# Patient Record
Sex: FEMALE | Race: WHITE
[De-identification: names, ages, dates, MRNs, and addresses within clinical notes are randomized per-mention and may not be internally consistent; named-entity substitution may affect disease eponyms.]

---

## 2019-11-10 ENCOUNTER — HOSPITAL ENCOUNTER (EMERGENCY)
Dept: HOSPITAL 62 - ER | Age: 29
Discharge: HOME | End: 2019-11-10
Payer: SELF-PAY

## 2019-11-10 VITALS — DIASTOLIC BLOOD PRESSURE: 69 MMHG | SYSTOLIC BLOOD PRESSURE: 128 MMHG

## 2019-11-10 DIAGNOSIS — F17.200: ICD-10-CM

## 2019-11-10 DIAGNOSIS — Z86.14: ICD-10-CM

## 2019-11-10 DIAGNOSIS — L02.412: Primary | ICD-10-CM

## 2019-11-10 PROCEDURE — 10060 I&D ABSCESS SIMPLE/SINGLE: CPT

## 2019-11-10 PROCEDURE — 99282 EMERGENCY DEPT VISIT SF MDM: CPT

## 2019-11-10 PROCEDURE — A6266 IMPREG GAUZE NO H20/SAL/YARD: HCPCS

## 2019-11-10 NOTE — ER DOCUMENT REPORT
HPI





- HPI


Time Seen by Provider: 11/10/19 15:03


Pain Level: 5


Notes: 





Otherwise healthy 29-year-old female with history of abscesses presenting with 

abscess to left axilla.  Patient reports this is been present for approximately 

3 days.  She denies any drainage from the area.  Denies any fevers.  Does report

history of MRSA.








Past Medical History





- General


Information source: Patient





- Social History


Smoking Status: Current Every Day Smoker


Family History: Reviewed & Not Pertinent


Patient has suicidal ideation: No


Patient has homicidal ideation: No


Infectious Medical History: Reports: Hx MRSA


Surgical Hx: Negative





- Immunizations


Hx Diphtheria, Pertussis, Tetanus Vaccination: Yes





Vertical Provider Document





- CONSTITUTIONAL


Notes: 





PHYSICAL EXAMINATION:





GENERAL: Well-appearing, well-nourished and in no acute distress.





HEAD: Atraumatic, normocephalic.





EYES: Pupils equal round extraocular movements intact,  conjunctiva are normal.





ENT: Nares patent





NECK: Normal range of motion





LUNGS: No respiratory distress





Musculoskeletal: Normal range of motion





NEUROLOGICAL:  Normal speech, normal gait. 





PSYCH: Normal mood, normal affect.





SKIN: Large area of induration, erythema fluctuance noted to left axillary area.





- INFECTION CONTROL


TRAVEL OUTSIDE OF THE [place] IN LAST 30 DAYS: No





Course





- Re-evaluation


Re-evalutation: 





Abscess incised and drained, see procedure note, patient tolerated well.  

Packing placed.





- Vital Signs


Vital signs: 


                                        











Temp Pulse Resp BP Pulse Ox


 


 98.7 F   105 H  16   140/80 H  98 


 


 11/10/19 14:49  11/10/19 14:49  11/10/19 14:49  11/10/19 14:49  11/10/19 14:49














Procedures





- Incision and Drainage


  ** Left axilla


Type: Simple


Anesthetic type: 1% Lidocaine


Blade size: 11


I&D procedure: Betadine prep applied


Incision Method: Incision made by scalpel





Discharge





- Discharge


Clinical Impression: 


 Abscess





Condition: Stable


Disposition: HOME, SELF-CARE


Additional Instructions: 


You were seen for an abscess that required drainage. Please clean this area with

soap and water twice daily and apply a topical antibiotic. Dress the area after 

each cleaning.  Return to the emergency department in 48 hours for packing 

removal .  Please return if you develop fever, vomiting, the pain at the site 

worsens, you notice spreading redness from the area, or you have any other 

symptoms that are concerning to you.


Prescriptions: 


Sulfamethoxazole/Trimethoprim [Bactrim Ds Tablet] 1 tab PO BID #14 tablet

## 2019-11-12 ENCOUNTER — HOSPITAL ENCOUNTER (EMERGENCY)
Dept: HOSPITAL 62 - ER | Age: 29
Discharge: LEFT BEFORE DISCHARGE | End: 2019-11-12
Payer: SELF-PAY

## 2019-11-12 ENCOUNTER — HOSPITAL ENCOUNTER (EMERGENCY)
Dept: HOSPITAL 62 - ER | Age: 29
Discharge: HOME | End: 2019-11-12
Payer: SELF-PAY

## 2019-11-12 VITALS — SYSTOLIC BLOOD PRESSURE: 132 MMHG | DIASTOLIC BLOOD PRESSURE: 70 MMHG

## 2019-11-12 DIAGNOSIS — L02.414: Primary | ICD-10-CM

## 2019-11-12 DIAGNOSIS — F17.200: ICD-10-CM

## 2019-11-12 DIAGNOSIS — Z53.21: Primary | ICD-10-CM

## 2019-11-12 DIAGNOSIS — Z86.14: ICD-10-CM

## 2019-11-12 PROCEDURE — 99282 EMERGENCY DEPT VISIT SF MDM: CPT

## 2019-11-12 NOTE — ER DOCUMENT REPORT
ED Medical Screen (RME)





- General


Chief Complaint: Wound Recheck


Stated Complaint: ABSCESS RECHECK


Time Seen by Provider: 11/12/19 18:43


TRAVEL OUTSIDE OF THE [place] IN LAST 30 DAYS: No





- HPI


Notes: 





11/12/19 18:45


29 year old female to the ED for wound check and packing change.  She had this 

drained two days ago.  She has NOT been taking her antibiotics.   





I performed a brief medical screening exam on this patient and determined that 

this patient needs further evaluation and management from Main side ER 

provider.  I have ordered labs and imaging studies as indicated to aid in 

expediting the patient's care.





- Related Data


Allergies/Adverse Reactions: 


                                        





No Known Allergies Allergy (Verified 11/12/19 18:43)


   











Past Medical History





- Immunizations


Hx Diphtheria, Pertussis, Tetanus Vaccination: Yes

## 2019-11-12 NOTE — ER DOCUMENT REPORT
HPI





- HPI


Time Seen by Provider: 11/12/19 18:43


Pain Level: 3


Context: 





Patient is a 29-year-old female who presents to the emergency department with a 

chief complaint of left arm abscess.  Patient reports she was seen 2 days ago 

and had an I&D of the left axilla.  Patient reports she has had abscesses 

underneath the axilla before and does have a history of MRSA.  Patient reports 

the wound was packed but continues to drain a thick yellow drainage with foul 

smell.  Patient denies fever.  Patient reports pain to the site.  Patient states

that she has not been taking her antibiotics as she just got them filled today. 

Patient has not had a dose today.





- REPRODUCTIVE


Reproductive: DENIES: Pregnant:





Past Medical History





- General


Information source: Patient





- Social History


Smoking Status: Current Every Day Smoker


Chew tobacco use (# tins/day): No


Frequency of alcohol use: None


Drug Abuse: None


Lives with: Friend


Family History: Reviewed & Not Pertinent


Patient has suicidal ideation: No


Patient has homicidal ideation: No





- Past Medical History


Cardiac Medical History: Reports: None


Pulmonary Medical History: Reports: None


EENT Medical History: Reports: None


Neurological Medical History: Reports: None


Endocrine Medical History: Reports: None


Renal/ Medical History: Reports: None


Malignancy Medical History: Reports: None


GI Medical History: Reports: None


Musculoskeletal Medical History: Reports None


Skin Medical History: Reports None


Psychiatric Medical History: Reports: None


Traumatic Medical History: Reports: None


Infectious Medical History: Reports: None


Surgical Hx: Negative





- Immunizations


Hx Diphtheria, Pertussis, Tetanus Vaccination: Yes





Vertical Provider Document





- CONSTITUTIONAL


Agree With Documented VS: Yes


Exam Limitations: No Limitations


General Appearance: No Apparent Distress





- INFECTION CONTROL


TRAVEL OUTSIDE OF THE [place] IN LAST 30 DAYS: No





- HEENT


HEENT: Atraumatic, Normocephalic, PERRLA





- NECK


Neck: Normal Inspection





- RESPIRATORY


Respiratory: Breath Sounds Normal, No Respiratory Distress





- CARDIOVASCULAR


Cardiovascular: Regular Rate, Regular Rhythm





- GI/ABDOMEN


Gastrointestinal: Abdomen Soft, Abdomen Non-Tender, Normal Bowel Sounds





- MUSCULOSKELETAL/EXTREMETIES


Musculoskeletal/Extremeties: FROM, Non-Tender





- NEURO


Level of Consciousness: Awake, Alert, Appropriate





- DERM


Integumentary: Abscess


Notes: 





Patient has an abscess underneath the left axilla.  This was I indeed 2 days 

ago.  There is packing hanging out of the incision with a yellow drainage.  + 

foul odor.  No surrounding cellulitis.





Course





- Re-evaluation


Re-evalutation: 





11/12/19 19:14


Patient will require examination of the abscess.  Will I&D and repacked the 

dressing.  We will give patient her first dose of Bactrim here and reiterated 

the importance of taking the antibiotics at home to treat the infection.  

Patient will need to return in 2 days for a wound recheck.


11/12/19 20:02


I did remove the old packing which was soaked in yellow drainage.  I did 

irrigate the wound with 20 cc of saline.  I did investigate the wound which did 

not reveal any pus pockets.  The wound does appear deep.  I did repack the 

wound.  Patient was given her first dose of Bactrim here.  Patient to return in 

2 days for recheck.





Discharge





- Discharge


Clinical Impression: 


 Abscess





Condition: Stable


Disposition: HOME, SELF-CARE


Additional Instructions: 


*Today he was seen in the emergency department for a wound recheck.  The wound 

did not need to be reevaluated and packed.  You do need to return in 2 days to 

have this rechecked.  Please monitor for signs of worsening to include redness, 

swelling, increased drainage and fever.  Please return the emergency department 

prior to 2 days if you do experience any of the symptoms.  Please keep the 

packing in place as this will keep the wound open.





*You have been given a dose of Bactrim today while you are here in the emergency

department.  You do need to take your oral antibiotics for its full completed 

course.











ABSCESS:





     You have an abscess (boil).  This a pus-forming infection, usually due to 

staph.  Some boils may be left to drain on their own, but most require lancing.


     From the time the tender lump first appears, it may be three or four days 

before the abscess is ready to marianela.  Local heat and rest help at this stage of

treatment.  An antibiotic may prevent spread of the infection.


     Once the abscess is opened, packing may be placed into it.  This is done so

pus is not sealed inside by premature closure of the cavity. The packing will be

removed at your follow-up visit or you may be advised to remove it yourself at 

home.  Sometimes this packing must be replaced a few times during healing.


     The wound will heal with surprisingly little scar.  Depending on the size 

and location of an abscess, healing can take one to four weeks.


     You may shower and wash the area around the incision site two or three 

times a day.


     Antibiotics may be prescribed, but are usually not necessary after an 

abscess has been drained.


     If you develop fever, chills, worsening pain, or increasing swelling in the

area, call the doctor or return immediately.








POST INCISION AND DRAINAGE:


     You have had an incision made to allow drainage of an abscess. The incision

must remain open so that pus and debris can drain from the wound.


     If the abscess cavity is large, packing is placed.  This keeps the tissues 

from collapsing and trapping pus inside, while the body shrinks the cavity.  The

packing may need to be replaced every day or two.  The physician will instruct 

you on the packing.


     Keep a bulky dressing over the area.  Replace it if it becomes saturated 

with blood or pus.  Do not disturb the packing (if present).


     You may shower and cleanse the area with gentle soap and warm water two or 

three times a day.  Local warmth may be soothing, and may promote faster 

healing.


     Return if you develop high fever or chills, or if you note spreading 

redness, increasing swelling, or increasing tenderness.











TRIMETHOPRIM-SULFA:


     You have been given a prescription for trimethoprim-sulfa (TMS, Septra, 

Bactrim).  This is a combination antibiotic of the sulfa class, often used for 

urinary tract infections, middle ear infections, bronchitis, shigella intestinal

infection, and Pneumocystis pneumonia.


     TMS is usually well-tolerated.  Occasional side effects include nausea and 

decreased appetite.  Septra is not recommended for infants less than two months 

of age.  Do not take this medication if you have experienced severe side effects

or allergy to sulfa medicine.


     You should stop this medicine at once and contact your physician if you 

develop any rash, joint pain, shortness of breath, bruising, or jaundice (yellow

color in the skin), or if you develop any other new or unusual symptoms.











FOLLOW-UP CARE:


Most simple abscesses will not require a follow up visit.   If you had packing 

placed in the abscess, remove it as instructed by the physician.  If you have 

been referred to a physician for follow-up care, call the physicians office for

an appointment as you were instructed or within the next two days.  If you 

experience worsening or a significant change in your symptoms, return to the 

Emergency Department at any time for re-evaluation.

## 2019-11-14 ENCOUNTER — HOSPITAL ENCOUNTER (EMERGENCY)
Dept: HOSPITAL 62 - ER | Age: 29
Discharge: HOME | End: 2019-11-14
Payer: SELF-PAY

## 2019-11-14 VITALS — SYSTOLIC BLOOD PRESSURE: 140 MMHG | DIASTOLIC BLOOD PRESSURE: 77 MMHG

## 2019-11-14 DIAGNOSIS — F17.200: ICD-10-CM

## 2019-11-14 DIAGNOSIS — L02.412: Primary | ICD-10-CM

## 2019-11-14 NOTE — ER DOCUMENT REPORT
HPI





- HPI


Time Seen by Provider: 11/14/19 12:51


Pain Level: Denies


Context: 





Patient is a 29-year-old female who presents to the emergency department with a 

chief complaint of wound recheck.  Patient reports she was seen here 2 days ago 

and had her left axilla wound repacked.  Patient reports she has been taking her

antibiotics as prescribed.  Patient reports her pain has improved although still

present.  Patient denies fever.  Patient denies excessive amount of drainage.  





- REPRODUCTIVE


LMP: 11/2019


Reproductive: DENIES: Pregnant:





Past Medical History





- General


Information source: Patient





- Social History


Smoking Status: Current Every Day Smoker


Chew tobacco use (# tins/day): No


Frequency of alcohol use: None


Drug Abuse: None


Lives with: Family


Family History: Reviewed & Not Pertinent


Patient has suicidal ideation: No


Patient has homicidal ideation: No





- Past Medical History


Cardiac Medical History: Reports: None


Pulmonary Medical History: Reports: None


EENT Medical History: Reports: None


Neurological Medical History: Reports: None


Endocrine Medical History: Reports: None


Renal/ Medical History: Reports: None


Malignancy Medical History: Reports: None


GI Medical History: Reports: None


Musculoskeletal Medical History: Reports None


Skin Medical History: Reports None


Psychiatric Medical History: Reports: None


Traumatic Medical History: Reports: None


Infectious Medical History: Reports: None


Surgical Hx: Negative





- Immunizations


Hx Diphtheria, Pertussis, Tetanus Vaccination: Yes





Vertical Provider Document





- CONSTITUTIONAL


Agree With Documented VS: Yes


Exam Limitations: No Limitations


General Appearance: No Apparent Distress





- INFECTION CONTROL


TRAVEL OUTSIDE OF THE [place] IN LAST 30 DAYS: No





- HEENT


HEENT: Atraumatic, Normocephalic, PERRLA





- NECK


Neck: Normal Inspection





- RESPIRATORY


Respiratory: Breath Sounds Normal, No Respiratory Distress





- CARDIOVASCULAR


Cardiovascular: Regular Rate, Regular Rhythm





- GI/ABDOMEN


Gastrointestinal: Abdomen Soft, Abdomen Non-Tender, Normal Bowel Sounds





- MUSCULOSKELETAL/EXTREMETIES


Musculoskeletal/Extremeties: FROM, Non-Tender





- NEURO


Level of Consciousness: Awake, Alert, Appropriate





- DERM


Integumentary: Warm, Dry


Notes: 





Healing incised abscess noted underneath the left axilla with packing still 

present.  No significant edema, erythema or excessive amount of drainage.  

Minimal tenderness around the area.





Course





- Re-evaluation


Re-evalutation: 





11/14/19 13:00


Packing was removed with a small amount of yellow exudate.  There is no active 

drainage from the wound.  I did see the patient 2 days ago and the wound does 

appear much better without erythema, edema or extreme tenderness.  Patient 

reports feeling much better.  At this time I do not believe additional packing 

is necessary.  I did give patient strict return precautions.





- Vital Signs


Vital signs: 


                                        











Temp Pulse Resp BP Pulse Ox


 


 98.1 F   93   18   140/77 H  100 


 


 11/14/19 12:48  11/14/19 12:48  11/14/19 12:48  11/14/19 12:48  11/14/19 12:48














Discharge





- Discharge


Clinical Impression: 


 Encounter for wound re-check





Condition: Stable


Disposition: HOME, SELF-CARE


Additional Instructions: 


*Today you are seen in the emergency department for a wound recheck.  The 

packing was removed from the draining abscess to the left axilla.  It does 

appear to be healing well.  It may continue to still drain over the next few 

days.  You can use warm compresses if needed.  Please continue to take your 

antibiotics for its full course of 1 week.  Please use Tylenol and ibuprofen as 

needed for pain and return to the emergency department if you have any new or 

worsening symptoms such as swelling, redness, excessive drainage, fever.

## 2020-06-14 ENCOUNTER — HOSPITAL ENCOUNTER (EMERGENCY)
Dept: HOSPITAL 62 - ER | Age: 30
LOS: 1 days | End: 2020-06-15
Payer: MEDICAID

## 2020-06-14 VITALS — DIASTOLIC BLOOD PRESSURE: 34 MMHG | SYSTOLIC BLOOD PRESSURE: 49 MMHG

## 2020-06-14 DIAGNOSIS — Z90.49: ICD-10-CM

## 2020-06-14 DIAGNOSIS — K92.0: ICD-10-CM

## 2020-06-14 DIAGNOSIS — I10: ICD-10-CM

## 2020-06-14 DIAGNOSIS — I46.9: Primary | ICD-10-CM

## 2020-06-14 DIAGNOSIS — K52.9: ICD-10-CM

## 2020-06-14 DIAGNOSIS — I45.10: ICD-10-CM

## 2020-06-14 LAB
ABSOLUTE LYMPHOCYTES# (MANUAL): 2.8 10^3/UL (ref 0.5–4.7)
ABSOLUTE MONOCYTES # (MANUAL): 0.8 10^3/UL (ref 0.1–1.4)
ADD MANUAL DIFF: YES
ANISOCYTOSIS BLD QL SMEAR: (no result)
BASOPHILS NFR BLD MANUAL: 0 % (ref 0–2)
EOSINOPHIL NFR BLD MANUAL: 1 % (ref 0–6)
ERYTHROCYTE [DISTWIDTH] IN BLOOD BY AUTOMATED COUNT: 15.3 % (ref 11.5–14)
HCT VFR BLD CALC: 43 % (ref 36–47)
HGB BLD-MCNC: 13.3 G/DL (ref 12–15.5)
INR PPP: 4.09
MCH RBC QN AUTO: 29.3 PG (ref 27–33.4)
MCHC RBC AUTO-ENTMCNC: 30.9 G/DL (ref 32–36)
MCV RBC AUTO: 95 FL (ref 80–97)
MONOCYTES % (MANUAL): 13 % (ref 3–13)
NEUTS BAND NFR BLD MANUAL: 7 % (ref 3–5)
PLATELET # BLD: 72 10^3/UL (ref 150–450)
PLATELET COMMENT: (no result)
POLYCHROMASIA BLD QL SMEAR: (no result)
PROTHROMBIN TIME: 40.7 SEC (ref 11.4–15.4)
RBC # BLD AUTO: 4.54 10^6/UL (ref 3.72–5.28)
SEGMENTED NEUTROPHILS % (MAN): 33 % (ref 42–78)
TOTAL CELLS COUNTED BLD: 100
VARIANT LYMPHS NFR BLD MANUAL: 46 % (ref 13–45)
WBC # BLD AUTO: 6 10^3/UL (ref 4–10.5)

## 2020-06-14 PROCEDURE — 99285 EMERGENCY DEPT VISIT HI MDM: CPT

## 2020-06-14 PROCEDURE — 86900 BLOOD TYPING SEROLOGIC ABO: CPT

## 2020-06-14 PROCEDURE — 93010 ELECTROCARDIOGRAM REPORT: CPT

## 2020-06-14 PROCEDURE — P9016 RBC LEUKOCYTES REDUCED: HCPCS

## 2020-06-14 PROCEDURE — 85730 THROMBOPLASTIN TIME PARTIAL: CPT

## 2020-06-14 PROCEDURE — 36415 COLL VENOUS BLD VENIPUNCTURE: CPT

## 2020-06-14 PROCEDURE — 86920 COMPATIBILITY TEST SPIN: CPT

## 2020-06-14 PROCEDURE — 85610 PROTHROMBIN TIME: CPT

## 2020-06-14 PROCEDURE — 93005 ELECTROCARDIOGRAM TRACING: CPT

## 2020-06-14 PROCEDURE — 85025 COMPLETE CBC W/AUTO DIFF WBC: CPT

## 2020-06-14 PROCEDURE — 86901 BLOOD TYPING SEROLOGIC RH(D): CPT

## 2020-06-14 PROCEDURE — 36430 TRANSFUSION BLD/BLD COMPNT: CPT

## 2020-06-14 PROCEDURE — 86850 RBC ANTIBODY SCREEN: CPT

## 2020-06-14 PROCEDURE — 82962 GLUCOSE BLOOD TEST: CPT

## 2020-06-14 PROCEDURE — 31500 INSERT EMERGENCY AIRWAY: CPT

## 2020-06-14 PROCEDURE — 36556 INSERT NON-TUNNEL CV CATH: CPT

## 2020-06-15 LAB — APTT BLD: 195.9 SEC (ref 23.5–35.8)

## 2020-06-15 NOTE — ER DOCUMENT REPORT
ED General





- General


Chief Complaint: Cardiac Arrest


Stated Complaint: CARDIAC ARREST


TRAVEL OUTSIDE OF THE [place] IN LAST 30 DAYS: No





- HPI


Notes: 





Patient was a 30-year-old female brought into the emergency department for 

evaluation via EMS.  History is obtained eventually from family friends, then 

EMS.  Evidently the patient has started complaining of some abdominal pain, was 

seen in the emergency department at [facility] at 1 AM the night before.  She

was diagnosed with gastroenteritis.  She was sent home with Zofran, loperamide, 

Bentyl.  The patient had some progression of the pain earlier in the evening, 

and according to onlookers started to look unwell.  They were going to drive her

to the hospital, but decision was made to call EMS.  Upon arrival of EMS, the 

patient went into cardiac arrest and CPR was instituted.  The patient received 

multiple rounds of epinephrine, bicarb, dextrose, calcium, and Levophed was 

started.  She was intubated with an eye gel.  They did have ROSC total of 4 

times, and actually the patient presented to the emergency department with a 

sinus rhythm.  She did have weak radial pulses initially, was agonal he 

breathing over the advanced airway.





- Related Data


Allergies/Adverse Reactions: 


                                        





No Known Allergies Allergy (Verified 19 12:50)


   











Past Medical History





- General


Information source: Emergency Med Personnel, [facility] Records, Outside Facility 

Records





- Social History


Smoking Status: Unknown if Ever Smoked


Family History: Other - unknown


Patient has homicidal ideation:  - unable to assess





- Past Medical History


Cardiac Medical History: Reports: Hx Hypertension


GI Medical History: Reports: Other - Chloelithiasis


Past Surgical History: Reports: Hx Cholecystectomy





- Immunizations


Hx Diphtheria, Pertussis, Tetanus Vaccination: Yes





Review of Systems





- Review of Systems


-: Yes ROS unobtainable due to patient's medical condition





Physical Exam





- Vital signs


Vitals: 





                                        











Pulse Ox


 


 45 L


 


 20 19:35














- Notes


Notes: 





Patient initially brought to the emergency department for evaluation with 

advanced Janak.  She had intraosseous lines and, she was cool, pale, mottled.  

Abdomen distended.  Her mouth, face, hair were covered in dark brown bloody 

emesis.   Head was normocephalic and appears atraumatic, pupils are initially 5 

mm and nonreactive. On initial evaluation the patient did have regular heart 

sounds, rhonchorous breath sounds with assisted ventilation.  Abdomen was 

distended and cool to the touch.  Extremities were mottled. 





Course





- Re-evaluation


Re-evalutation: 





06/15/20 01:46


Patient presented to the emergency department for evaluation at 1927, status 

post cardiac arrest.  I was present in the room upon patient's arrival.  Upon 

transfer to the table, it was noted that the patient was pulseless.  Patient 

moved to the bed with backboard in place, CPR started.  Pads were connected to 

the defibrillator.  Patient was initially in PEA, 1 mg of epinephrine 

administered.  CPR continued and pulses verified with CPR.  And pulse check, the

patient did have a sinus rhythm, tachycardic, pulses were noted.  In 1936 the 

patient was administered etomidate and intubated with glide scope, 7.5 cm ET 

tube inserted, 21 cm at the teeth, with positive color change.  Please see 

separate procedure note.  OG tube was then placed.  Point-of-care glucose was 

noted to be 158.  Order was given verbally for OG placement.  OG tube placed to 

suction and maroon blood was suctioned from the stomach.  Temperature Zuñiga was 

placed.  I called for infusion of 2 units of packed red cells, emergency release

blood.  Patient had ROSC for 9 to 10 minutes, when she was again noted to be 

pulseless.  Patient back in PEA.  CPR begun, 1 mg of epinephrine pushed.  

Patient again with good pulses with compressions.  She had continued PEA, no 

pulses.  CPR resumed.  1 minute later patient's PEA showed a heart rate in the 

30s, she was administered 1 mg of atropine IV.  At the three-minute cali another

milligram of IV epinephrine was ordered.  After 1 minute of continued high-

quality CPR, pulse check revealed palpable pulses.  Patient once again lost 

pulses, continued PEA, CPR started, epinephrine given via IV push.  Good pulses 

with compressions.  ROSC obtained on rhythm check, and cardiac activity verified

with bed side ultrasound.  Patient had multiple episodes of PEA, loss of pulses,

and ROSC.  During ROSC times, patient had Protonix IV push.  Rapid infusion of 2

further units of blood were ordered during this time as well.  With final ROSC, 

patient had increased Levophed, from 8 mcg/min to 12 mcg/min, the third and 

fourth units of emergency blood were started, epinephrine drip started at 5 mics

per minute.  IV fluids infused.  2 minutes later pulses were lost again, 

initially PCP MD started with the PEA underlying rhythm.  Epinephrine drip was 

opened wide.  Pulse check showed wide complex PEA then asystole.  Her pupils 

were now 8 mm and completely unreactive.  She had not made any meaningful 

movements during the course of this code.  Time of death declared at .


I notified friends, the only people present initially, who actually had her 

children present, of the patient's death with JOSSIE Arriaza, present.  Patient's 

children are being cared for by their respective fathers.  I spoke with 

patient's father at [id number], he was unfortunately in [place], not available to come

in person.  He was notified of his daughter's death.  Given this patient's young

age and lack of known significant medical issues, she will become a medical 

examiner's case.





- Vital Signs


Vital signs: 





                                        











Temp Pulse Resp BP Pulse Ox


 


 0 F L     23 H  49/34 L  65 L


 


 20 20:26     20 20:26  20 20:26  20 20:20














- Laboratory


Result Diagrams: 


                                 20 19:42





                                 20 19:42


Laboratory results interpreted by me: 





                                        











  20





  19:40 19:42 19:42


 


MCHC    30.9 L


 


RDW    15.3 H


 


Plt Count    72 L


 


Seg Neuts % (Manual)    33 L


 


Band Neutrophils %    7 H


 


Lymphocytes % (Manual)    46 H


 


PT   


 


APTT   


 


POC Glucose  158 H  


 


Crossmatch   See Detail 














  20





  19:42


 


MCHC 


 


RDW 


 


Plt Count 


 


Seg Neuts % (Manual) 


 


Band Neutrophils % 


 


Lymphocytes % (Manual) 


 


PT  40.7 H


 


APTT  195.9 H*


 


POC Glucose 


 


Crossmatch 














- EKG Interpretation by Me


Additional EKG results interpreted by me: 





06/15/20 02:00


Irregular/sinus versus junctional rhythm, tachycardic with a rate of 168.  Right

axis deviation, IVCD, right bundle branch block.  No old studies available for 

comparison.





Procedures





- Intubation


  ** Orotracheal


Time of Intubation: 19:37


Medications: Etomidate


Intubation method: Orotracheal


Blade type: Win


Blade size: 3


Equipment used: Glidescope


ETT size: 7.5


ETT secured at: Teeth


ETT secured at (cm): 21


Breath Sounds after Intubation: Equal


End tidal CO2 confirmed: Yes





Critical Care Note





- Critical Care Note


Total time excluding time spent on procedures (mins): 65





Discharge





- Discharge


Clinical Impression: 


 Cardiac arrest, Upper GI bleeding





Disposition:

## 2020-06-15 NOTE — OPERATIVE REPORT
Bedside Procedure





- History of Present Illness


Indication for Procedure: Emergent placement during cardiac arrest, multiple 

blood transfusions.


Date: 06/14/20


Provider: AIDEN BREAUX





- Central Line


  ** Right Femoral


Time completed: 19:57


Consent obtained: No - Emergent


Central line pre-insertion: Other - Emergent insertion


Central line lumen type: Triple


Ultrasound guided: No


Line secured with sutures: Yes


Central line post-insertion: Blood return from lumens, Sutured


Complications: No

## 2020-06-15 NOTE — EKG REPORT
SEVERITY:- ABNORMAL ECG -

ATRIAL FIBRILLATION

RBBB AND LPFB

:

Confirmed by: Antoine Del Real MD 15-Jj-2020 07:29:21

## 2020-06-15 NOTE — PROGRESS NOTE
Provider Note


Provider Note: 


An 1925 I was called emergently to the ED for a 30-year-old female who was 

arriving EMS post cardiac arrest.  EMS reports they were called to the scene for

difficulty breathing and when they arrived to the scene, patient's face and neck

were cyanotic, agonal breathing and weak radial pulses.  EMS began rescue breath

ing at which time pulses were lost CPR was started.  EMS achieved ROSC, a total 

of 4 times in the field with a total code time of approximately 1 hour and 20 

minutes.  She received 6 rounds of epi,1 amp of bicarb, 1 amp of D10, 1 amp of 

calcium, Levophed was started at 8 mics/min, and a D50 infusion was initiated.  

A #3 I gel airway was inserted with continuous bagging.  Per EMS report patient 

was discharged from [facility], on the day prior to presentation here 

with a diagnosis of gastroenteritis.  Per outside hospital records patient has a

history of gallstones status post cholecystectomy 4 years ago and hypertension. 

When patient arrived she was noted to have a large amount of coffee-ground 

emesis coming from nose and mouth skin was mottled and cool unable to obtain 

blood pressure and CPR was in progress.  Please see code sheet on nursing notes.

 She did have multiple rounds of CPR with ROSC lasting for only a few minutes.  

Her OG tube was draining dark red blood.  At 2032 during CPR performed a pulse 

check, pulses were absent on the monitor she had a wide complex PEA.  CPR was 

stopped, time of death 2032